# Patient Record
Sex: MALE | Race: WHITE | NOT HISPANIC OR LATINO | Employment: OTHER | URBAN - METROPOLITAN AREA
[De-identification: names, ages, dates, MRNs, and addresses within clinical notes are randomized per-mention and may not be internally consistent; named-entity substitution may affect disease eponyms.]

---

## 2019-11-19 ENCOUNTER — HOSPITAL ENCOUNTER (EMERGENCY)
Facility: HOSPITAL | Age: 44
Discharge: HOME | End: 2019-11-19
Attending: EMERGENCY MEDICINE | Admitting: EMERGENCY MEDICINE

## 2019-11-19 VITALS
OXYGEN SATURATION: 96 % | HEIGHT: 74 IN | TEMPERATURE: 98.8 F | RESPIRATION RATE: 18 BRPM | DIASTOLIC BLOOD PRESSURE: 82 MMHG | HEART RATE: 88 BPM | SYSTOLIC BLOOD PRESSURE: 115 MMHG | BODY MASS INDEX: 22.46 KG/M2 | WEIGHT: 175 LBS

## 2019-11-19 DIAGNOSIS — F10.10 ETOH ABUSE: Primary | ICD-10-CM

## 2019-11-19 DIAGNOSIS — F15.10 METHAMPHETAMINE ABUSE (HCC): ICD-10-CM

## 2019-11-19 LAB
ALBUMIN SERPL-MCNC: 4.6 G/DL (ref 3.5–5.2)
ALBUMIN/GLOB SERPL: 1.4 G/DL
ALP SERPL-CCNC: 59 U/L (ref 39–117)
ALT SERPL W P-5'-P-CCNC: 11 U/L (ref 1–41)
AMPHET+METHAMPHET UR QL: POSITIVE
AMPHETAMINES UR QL: POSITIVE
ANION GAP SERPL CALCULATED.3IONS-SCNC: 19 MMOL/L (ref 5–15)
AST SERPL-CCNC: 18 U/L (ref 1–40)
BACTERIA UR QL AUTO: ABNORMAL /HPF
BARBITURATES UR QL SCN: NEGATIVE
BASOPHILS # BLD AUTO: 0.03 10*3/MM3 (ref 0–0.2)
BASOPHILS NFR BLD AUTO: 0.5 % (ref 0–1.5)
BENZODIAZ UR QL SCN: POSITIVE
BILIRUB SERPL-MCNC: 0.7 MG/DL (ref 0.2–1.2)
BILIRUB UR QL STRIP: ABNORMAL
BUN BLD-MCNC: 21 MG/DL (ref 6–20)
BUN/CREAT SERPL: 19.8 (ref 7–25)
BUPRENORPHINE SERPL-MCNC: NEGATIVE NG/ML
CALCIUM SPEC-SCNC: 9.6 MG/DL (ref 8.6–10.5)
CANNABINOIDS SERPL QL: NEGATIVE
CHLORIDE SERPL-SCNC: 100 MMOL/L (ref 98–107)
CLARITY UR: ABNORMAL
CO2 SERPL-SCNC: 21 MMOL/L (ref 22–29)
COCAINE UR QL: NEGATIVE
COD CRY URNS QL: ABNORMAL /HPF
COLOR UR: ABNORMAL
CREAT BLD-MCNC: 1.06 MG/DL (ref 0.76–1.27)
DEPRECATED RDW RBC AUTO: 39.8 FL (ref 37–54)
EOSINOPHIL # BLD AUTO: 0.05 10*3/MM3 (ref 0–0.4)
EOSINOPHIL NFR BLD AUTO: 0.8 % (ref 0.3–6.2)
ERYTHROCYTE [DISTWIDTH] IN BLOOD BY AUTOMATED COUNT: 12.5 % (ref 12.3–15.4)
ETHANOL BLD-MCNC: <10 MG/DL (ref 0–10)
GFR SERPL CREATININE-BSD FRML MDRD: 77 ML/MIN/1.73
GLOBULIN UR ELPH-MCNC: 3.4 GM/DL
GLUCOSE BLD-MCNC: 131 MG/DL (ref 65–99)
GLUCOSE UR STRIP-MCNC: NEGATIVE MG/DL
HCT VFR BLD AUTO: 46.8 % (ref 37.5–51)
HGB BLD-MCNC: 15.9 G/DL (ref 13–17.7)
HGB UR QL STRIP.AUTO: NEGATIVE
HOLD SPECIMEN: NORMAL
HOLD SPECIMEN: NORMAL
HYALINE CASTS UR QL AUTO: ABNORMAL /LPF
IMM GRANULOCYTES # BLD AUTO: 0.03 10*3/MM3 (ref 0–0.05)
IMM GRANULOCYTES NFR BLD AUTO: 0.5 % (ref 0–0.5)
KETONES UR QL STRIP: ABNORMAL
LEUKOCYTE ESTERASE UR QL STRIP.AUTO: NEGATIVE
LIPASE SERPL-CCNC: 16 U/L (ref 13–60)
LYMPHOCYTES # BLD AUTO: 1.75 10*3/MM3 (ref 0.7–3.1)
LYMPHOCYTES NFR BLD AUTO: 26.4 % (ref 19.6–45.3)
MCH RBC QN AUTO: 29.8 PG (ref 26.6–33)
MCHC RBC AUTO-ENTMCNC: 34 G/DL (ref 31.5–35.7)
MCV RBC AUTO: 87.6 FL (ref 79–97)
METHADONE UR QL SCN: NEGATIVE
MONOCYTES # BLD AUTO: 0.95 10*3/MM3 (ref 0.1–0.9)
MONOCYTES NFR BLD AUTO: 14.3 % (ref 5–12)
MUCOUS THREADS URNS QL MICRO: ABNORMAL /HPF
NEUTROPHILS # BLD AUTO: 3.82 10*3/MM3 (ref 1.7–7)
NEUTROPHILS NFR BLD AUTO: 57.5 % (ref 42.7–76)
NITRITE UR QL STRIP: NEGATIVE
NRBC BLD AUTO-RTO: 0 /100 WBC (ref 0–0.2)
OPIATES UR QL: NEGATIVE
OXYCODONE UR QL SCN: NEGATIVE
PCP UR QL SCN: NEGATIVE
PH UR STRIP.AUTO: <=5 [PH] (ref 5–8)
PLAT MORPH BLD: NORMAL
PLATELET # BLD AUTO: 227 10*3/MM3 (ref 140–450)
PMV BLD AUTO: 9.5 FL (ref 6–12)
POTASSIUM BLD-SCNC: 3.5 MMOL/L (ref 3.5–5.2)
PROPOXYPH UR QL: NEGATIVE
PROT SERPL-MCNC: 8 G/DL (ref 6–8.5)
PROT UR QL STRIP: ABNORMAL
RBC # BLD AUTO: 5.34 10*6/MM3 (ref 4.14–5.8)
RBC # UR: ABNORMAL /HPF
RBC MORPH BLD: NORMAL
REF LAB TEST METHOD: ABNORMAL
SODIUM BLD-SCNC: 140 MMOL/L (ref 136–145)
SP GR UR STRIP: 1.03 (ref 1–1.03)
SQUAMOUS #/AREA URNS HPF: ABNORMAL /HPF
TRICYCLICS UR QL SCN: NEGATIVE
TROPONIN T SERPL-MCNC: <0.01 NG/ML (ref 0–0.03)
UROBILINOGEN UR QL STRIP: ABNORMAL
WBC MORPH BLD: NORMAL
WBC NRBC COR # BLD: 6.63 10*3/MM3 (ref 3.4–10.8)
WBC UR QL AUTO: ABNORMAL /HPF
WHOLE BLOOD HOLD SPECIMEN: NORMAL
WHOLE BLOOD HOLD SPECIMEN: NORMAL

## 2019-11-19 PROCEDURE — 83690 ASSAY OF LIPASE: CPT | Performed by: EMERGENCY MEDICINE

## 2019-11-19 PROCEDURE — 99284 EMERGENCY DEPT VISIT MOD MDM: CPT

## 2019-11-19 PROCEDURE — 25010000002 MAGNESIUM SULFATE PER 500 MG OF MAGNESIUM: Performed by: EMERGENCY MEDICINE

## 2019-11-19 PROCEDURE — 81001 URINALYSIS AUTO W/SCOPE: CPT | Performed by: EMERGENCY MEDICINE

## 2019-11-19 PROCEDURE — 80307 DRUG TEST PRSMV CHEM ANLYZR: CPT

## 2019-11-19 PROCEDURE — 25010000002 LORAZEPAM PER 2 MG: Performed by: EMERGENCY MEDICINE

## 2019-11-19 PROCEDURE — 96365 THER/PROPH/DIAG IV INF INIT: CPT

## 2019-11-19 PROCEDURE — 93005 ELECTROCARDIOGRAM TRACING: CPT | Performed by: EMERGENCY MEDICINE

## 2019-11-19 PROCEDURE — 80053 COMPREHEN METABOLIC PANEL: CPT | Performed by: EMERGENCY MEDICINE

## 2019-11-19 PROCEDURE — 85007 BL SMEAR W/DIFF WBC COUNT: CPT | Performed by: EMERGENCY MEDICINE

## 2019-11-19 PROCEDURE — 25010000002 THIAMINE PER 100 MG: Performed by: EMERGENCY MEDICINE

## 2019-11-19 PROCEDURE — 93005 ELECTROCARDIOGRAM TRACING: CPT

## 2019-11-19 PROCEDURE — 84484 ASSAY OF TROPONIN QUANT: CPT | Performed by: NURSE PRACTITIONER

## 2019-11-19 PROCEDURE — 85025 COMPLETE CBC W/AUTO DIFF WBC: CPT | Performed by: EMERGENCY MEDICINE

## 2019-11-19 PROCEDURE — 96375 TX/PRO/DX INJ NEW DRUG ADDON: CPT

## 2019-11-19 RX ORDER — LORAZEPAM 2 MG/ML
1 INJECTION INTRAMUSCULAR ONCE
Status: COMPLETED | OUTPATIENT
Start: 2019-11-19 | End: 2019-11-19

## 2019-11-19 RX ORDER — DIAZEPAM 5 MG/1
5 TABLET ORAL EVERY 6 HOURS PRN
Qty: 8 TABLET | Refills: 0 | Status: SHIPPED | OUTPATIENT
Start: 2019-11-19 | End: 2021-12-07

## 2019-11-19 RX ADMIN — LORAZEPAM 1 MG: 2 INJECTION INTRAMUSCULAR; INTRAVENOUS at 19:15

## 2019-11-19 RX ADMIN — THIAMINE HYDROCHLORIDE 1000 ML/HR: 100 INJECTION, SOLUTION INTRAMUSCULAR; INTRAVENOUS at 17:58

## 2019-11-19 RX ADMIN — SODIUM CHLORIDE, POTASSIUM CHLORIDE, SODIUM LACTATE AND CALCIUM CHLORIDE 1000 ML: 600; 310; 30; 20 INJECTION, SOLUTION INTRAVENOUS at 17:28

## 2019-11-19 NOTE — PROGRESS NOTES
Continued Stay Note  [facility]     Patient Name: Milton Madrid  MRN: [id number]  Today's Date: 11/19/2019    Admit Date: 11/19/2019    Discharge Plan     Row Name 11/19/19 1812       Plan    Plan  [facility] IOP with sober living option Thursday @ 1000    Plan Comments  Interviewed pt in the ER- he has a long standing AUDX20 years.  He has completed 14 different treatment programs- the last 2 programs were:  [facility] ([facility] term care) he completed that 2 years ago and was a [facility] [place].  He relapsed after that and attended [facility] but did not complete that program.[facility] the , [facility] and the patient is welcome to come back there for treatment.  He states that recently he has been drinking about a case of beer per day, but in the past couple of days his drinking has diminished secondary to Methamphetamine usage.  He used 1 gm of methamphetamine in 24 hours. He does not wish to go into detox, and per his recent use hx, detox may not actually best serve him as her main DOC is meth.  He does not desire inpatient treatment either, he feels that an IOP program will serve him the very best.  He is alert and oriented with rapid speech patterns, but is completely lucid. He is becoming anxious at this time- I spoke with PB Coleman and relayed all of this information.  At this time, patient has an appointment for Agustin this Thursday @1000.  [facility] also [facility] higher [facility], should this patient require that, they will refer him to their other higher level of care facilities.  Thank you very much for involving me in this pleasant gentleman's care.        Discharge Codes    No documentation.             Avis Proctor, RN ,BSN   Addiction Coordinator

## 2019-11-19 NOTE — ED PROVIDER NOTES
Subjective   Milton Madrid is a 44 y.o. male who presents to the ED with c/o an addiction problem. The patient presents for alcohol detox upon his own and his family wishes. He has a history of alcohol abuse, having never gone longer than a year being sober and reports that for the past 20 weeks he has drank 30 beers a day. His last alcoholic beverage was this morning. He states he has not done methamphetamine in approximately 1.5 years but 11 days ago he purchased 1 gram of it and had snorted all of it by the next day. The patient reports having hallucinations while being high on the methamphetamine. He complains of body aches and resolved chest pain but denies suicidal ideation. There are no other acute complaints at this time.        History provided by:  Patient  Addiction Problem   Location:  Addicted to alcohol  Severity:  Moderate  Onset quality:  Gradual  Duration:  20 weeks  Timing:  Constant  Progression:  Worsening  Chronicity:  Chronic  Context:  Patient has a history of alcohol abuse and recently snorted methamphetamine  Relieved by:  None tried  Worsened by:  Nothing  Ineffective treatments:  None tried  Associated symptoms: chest pain (resolved)    Associated symptoms: no loss of consciousness    Risk factors:  Alcohol abuse.      Review of Systems   Cardiovascular: Positive for chest pain (resolved).   Neurological: Negative for loss of consciousness, syncope and weakness.   Psychiatric/Behavioral: Positive for hallucinations. Negative for self-injury and suicidal ideas.   All other systems reviewed and are negative.      Past Medical History:   Diagnosis Date   • Panic attack        No Known Allergies    History reviewed. No pertinent surgical history.    History reviewed. No pertinent family history.    Social History     Socioeconomic History   • Marital status:      Spouse name: Not on file   • Number of children: Not on file   • Years of education: Not on file   • Highest education  level: Not on file         Objective   Physical Exam   Constitutional: He is oriented to person, place, and time. He appears well-developed and well-nourished. No distress.   HENT:   Head: Normocephalic and atraumatic.   Eyes: Conjunctivae are normal. No scleral icterus.   Neck: Normal range of motion. Neck supple.   Cardiovascular: Normal rate, regular rhythm and normal heart sounds.   No murmur heard.  Pulmonary/Chest: Effort normal and breath sounds normal. No respiratory distress.   Abdominal: Soft. There is no tenderness.   Musculoskeletal: Normal range of motion. He exhibits no edema.   Neurological: He is alert and oriented to person, place, and time.   Skin: Skin is warm and dry.   Psychiatric: He has a normal mood and affect. His behavior is normal.   Nursing note and vitals reviewed.      Procedures         ED Course  ED Course as of Nov 19 2020 Tue Nov 19, 2019   1717 Methamphetamine, Ur: (!) Positive [RS]   1717 Amphetamine, Urine Qual: (!) Positive [RS]   1717 Benzodiazepine Screen, Urine: (!) Positive [RS]   1736 [facility] at .  [JM]   5199 RALPH query complete. Treatment plan to include limited course of prescribed  controlled substance. Risks including addiction, benefits, and alternatives presented to patient.   Request number: [id number]  [BS]   185 Lyle AMBRIZ is bedside re-evaluating the patient and updating him on the results of the studies.  [BS]   2017 Plan of care discussed with Dr. Dalton.  Will give a short course of Valium until he can be evaluated by his outpatient detox clinic.  He is going to be going to a place [facility] which is run by somebody named Ag.  He has been there before and has had good results.  And apparently they have the capability of doing inpatient as well.  Patient is aware of these plans.  He feels much better he is wanting around the ER ready to leave.  He has no urinary symptoms.  [JM]      ED Course User Index  [BS] Alvin Vergara  BOBY  [JM] Lyle Wood APRN  [RS] Gerardo Dalton MD       Recent Results (from the past 24 hour(s))   Ethanol    Collection Time: 11/19/19  1:59 PM   Result Value Ref Range    Ethanol <10 0 - 10 mg/dL   Urine Drug Screen - Urine, Clean Catch    Collection Time: 11/19/19  1:59 PM   Result Value Ref Range    THC, Screen, Urine Negative Negative    Phencyclidine (PCP), Urine Negative Negative    Cocaine Screen, Urine Negative Negative    Methamphetamine, Ur Positive (A) Negative    Opiate Screen Negative Negative    Amphetamine Screen, Urine Positive (A) Negative    Benzodiazepine Screen, Urine Positive (A) Negative    Tricyclic Antidepressants Screen Negative Negative    Methadone Screen, Urine Negative Negative    Barbiturates Screen, Urine Negative Negative    Oxycodone Screen, Urine Negative Negative    Propoxyphene Screen Negative Negative    Buprenorphine, Screen, Urine Negative Negative   Light Blue Top    Collection Time: 11/19/19  1:59 PM   Result Value Ref Range    Extra Tube hold for add-on    Green Top (Gel)    Collection Time: 11/19/19  1:59 PM   Result Value Ref Range    Extra Tube Hold for add-ons.    Lavender Top    Collection Time: 11/19/19  1:59 PM   Result Value Ref Range    Extra Tube hold for add-on    Gold Top - SST    Collection Time: 11/19/19  1:59 PM   Result Value Ref Range    Extra Tube Hold for add-ons.    Comprehensive Metabolic Panel    Collection Time: 11/19/19  1:59 PM   Result Value Ref Range    Glucose 131 (H) 65 - 99 mg/dL    BUN 21 (H) 6 - 20 mg/dL    Creatinine 1.06 0.76 - 1.27 mg/dL    Sodium 140 136 - 145 mmol/L    Potassium 3.5 3.5 - 5.2 mmol/L    Chloride 100 98 - 107 mmol/L    CO2 21.0 (L) 22.0 - 29.0 mmol/L    Calcium 9.6 8.6 - 10.5 mg/dL    Total Protein 8.0 6.0 - 8.5 g/dL    Albumin 4.60 3.50 - 5.20 g/dL    ALT (SGPT) 11 1 - 41 U/L    AST (SGOT) 18 1 - 40 U/L    Alkaline Phosphatase 59 39 - 117 U/L    Total Bilirubin 0.7 0.2 - 1.2 mg/dL    eGFR Non African Amer 77 >60  mL/min/1.73    Globulin 3.4 gm/dL    A/G Ratio 1.4 g/dL    BUN/Creatinine Ratio 19.8 7.0 - 25.0    Anion Gap 19.0 (H) 5.0 - 15.0 mmol/L   Urinalysis With Microscopic If Indicated (No Culture) - Urine, Clean Catch    Collection Time: 11/19/19  1:59 PM   Result Value Ref Range    Color, UA Dark Yellow (A) Yellow, Straw    Appearance, UA Turbid (A) Clear    pH, UA <=5.0 5.0 - 8.0    Specific Gravity, UA 1.032 (H) 1.001 - 1.030    Glucose, UA Negative Negative    Ketones, UA 15 mg/dL (1+) (A) Negative    Bilirubin, UA Small (1+) (A) Negative    Blood, UA Negative Negative    Protein, UA Trace (A) Negative    Leuk Esterase, UA Negative Negative    Nitrite, UA Negative Negative    Urobilinogen, UA 1.0 E.U./dL 0.2 - 1.0 E.U./dL   Lipase    Collection Time: 11/19/19  1:59 PM   Result Value Ref Range    Lipase 16 13 - 60 U/L   CBC Auto Differential    Collection Time: 11/19/19  1:59 PM   Result Value Ref Range    WBC 6.63 3.40 - 10.80 10*3/mm3    RBC 5.34 4.14 - 5.80 10*6/mm3    Hemoglobin 15.9 13.0 - 17.7 g/dL    Hematocrit 46.8 37.5 - 51.0 %    MCV 87.6 79.0 - 97.0 fL    MCH 29.8 26.6 - 33.0 pg    MCHC 34.0 31.5 - 35.7 g/dL    RDW 12.5 12.3 - 15.4 %    RDW-SD 39.8 37.0 - 54.0 fl    MPV 9.5 6.0 - 12.0 fL    Platelets 227 140 - 450 10*3/mm3    Neutrophil % 57.5 42.7 - 76.0 %    Lymphocyte % 26.4 19.6 - 45.3 %    Monocyte % 14.3 (H) 5.0 - 12.0 %    Eosinophil % 0.8 0.3 - 6.2 %    Basophil % 0.5 0.0 - 1.5 %    Immature Grans % 0.5 0.0 - 0.5 %    Neutrophils, Absolute 3.82 1.70 - 7.00 10*3/mm3    Lymphocytes, Absolute 1.75 0.70 - 3.10 10*3/mm3    Monocytes, Absolute 0.95 (H) 0.10 - 0.90 10*3/mm3    Eosinophils, Absolute 0.05 0.00 - 0.40 10*3/mm3    Basophils, Absolute 0.03 0.00 - 0.20 10*3/mm3    Immature Grans, Absolute 0.03 0.00 - 0.05 10*3/mm3    nRBC 0.0 0.0 - 0.2 /100 WBC   Troponin    Collection Time: 11/19/19  1:59 PM   Result Value Ref Range    Troponin T <0.010 0.000 - 0.030 ng/mL   Urinalysis, Microscopic Only -  Urine, Clean Catch    Collection Time: 11/19/19  1:59 PM   Result Value Ref Range    RBC, UA 3-6 (A) None Seen, 0-2 /HPF    WBC, UA 3-5 (A) None Seen, 0-2 /HPF    Bacteria, UA 3+ (A) None Seen, Trace /HPF    Squamous Epithelial Cells, UA 0-2 None Seen, 0-2 /HPF    Hyaline Casts, UA 13-20 0 - 6 /LPF    Calcium Oxalate Crystals, UA Large/3+ None Seen /HPF    Mucus, UA Large/3+ (A) None Seen, Trace /HPF    Methodology Manual Light Microscopy    Scan Slide    Collection Time: 11/19/19  1:59 PM   Result Value Ref Range    RBC Morphology Normal Normal    WBC Morphology Normal Normal    Platelet Morphology Normal Normal     Note: In addition to lab results from this visit, the labs listed above may include labs taken at another facility or during a different encounter within the last 24 hours. Please correlate lab times with ED admission and discharge times for further clarification of the services performed during this visit.    No orders to display     Vitals:    11/19/19 1800 11/19/19 1815 11/19/19 1930 11/19/19 1945   BP: 142/94 136/97 123/91 115/82   BP Location:       Patient Position:       Pulse: 93 98 84 88   Resp:       Temp:       TempSrc:       SpO2: 98% 97% 95% 96%   Weight:       Height:         Medications   multiple vitamin (M.V.I. Adult) 10 mL, thiamine (B-1) 100 mg, folic acid 1 mg, magnesium sulfate 2 g in sodium chloride 0.9 % 1,000 mL infusion (0 mL/hr Intravenous Stopped 11/19/19 1914)   lactated ringers bolus 1,000 mL (0 mL Intravenous Stopped 11/19/19 1759)   LORazepam (ATIVAN) injection 1 mg (1 mg Intravenous Given 11/19/19 1915)     ECG/EMG Results (last 24 hours)     Procedure Component Value Units Date/Time    ECG 12 Lead [[id number]] Collected:  11/19/19 1420     Updated:  11/19/19 1722        ECG 12 Lead                           [facility]    Final diagnoses:   ETOH abuse   Methamphetamine abuse (CMS/[facility])       Documentation assistance provided by perri LANDIS [facility].  Information recorded by the  scribe was done at my direction and has been verified and validated by me.     Alvin Vergara  11/19/19 1750       Lyle Wood APRN  11/19/19 0799

## 2020-03-28 ENCOUNTER — HOSPITAL ENCOUNTER (EMERGENCY)
Facility: HOSPITAL | Age: 45
Discharge: LEFT BEFORE DISCHARGE | End: 2020-03-28

## 2020-03-28 VITALS
WEIGHT: 180 LBS | HEIGHT: 74 IN | BODY MASS INDEX: 23.1 KG/M2 | TEMPERATURE: 99 F | OXYGEN SATURATION: 95 % | SYSTOLIC BLOOD PRESSURE: 119 MMHG | DIASTOLIC BLOOD PRESSURE: 80 MMHG | HEART RATE: 100 BPM | RESPIRATION RATE: 20 BRPM

## 2020-03-28 PROCEDURE — 99211 OFF/OP EST MAY X REQ PHY/QHP: CPT

## 2021-04-09 ENCOUNTER — HOSPITAL ENCOUNTER (OUTPATIENT)
Dept: VACCINE CLINIC | Facility: HOSPITAL | Age: 46
Discharge: HOME | End: 2021-04-09
Attending: INTERNAL MEDICINE

## 2021-12-07 ENCOUNTER — LAB (OUTPATIENT)
Dept: LAB | Facility: HOSPITAL | Age: 46
End: 2021-12-07

## 2021-12-07 ENCOUNTER — OFFICE VISIT (OUTPATIENT)
Dept: INTERNAL MEDICINE | Facility: CLINIC | Age: 46
End: 2021-12-07

## 2021-12-07 ENCOUNTER — TELEPHONE (OUTPATIENT)
Dept: INTERNAL MEDICINE | Facility: CLINIC | Age: 46
End: 2021-12-07

## 2021-12-07 VITALS
DIASTOLIC BLOOD PRESSURE: 93 MMHG | WEIGHT: 194 LBS | HEIGHT: 74 IN | TEMPERATURE: 98 F | BODY MASS INDEX: 24.9 KG/M2 | HEART RATE: 71 BPM | SYSTOLIC BLOOD PRESSURE: 130 MMHG

## 2021-12-07 DIAGNOSIS — M54.31 SCIATICA OF RIGHT SIDE: ICD-10-CM

## 2021-12-07 DIAGNOSIS — Z51.81 ENCOUNTER FOR MONITORING SUBOXONE MAINTENANCE THERAPY: ICD-10-CM

## 2021-12-07 DIAGNOSIS — Z83.3 FAMILY HISTORY OF DIABETES MELLITUS: ICD-10-CM

## 2021-12-07 DIAGNOSIS — Z13.21 ENCOUNTER FOR VITAMIN DEFICIENCY SCREENING: Primary | ICD-10-CM

## 2021-12-07 DIAGNOSIS — F41.9 ANXIETY AND DEPRESSION: ICD-10-CM

## 2021-12-07 DIAGNOSIS — Z13.220 LIPID SCREENING: ICD-10-CM

## 2021-12-07 DIAGNOSIS — Z79.899 ENCOUNTER FOR MONITORING SUBOXONE MAINTENANCE THERAPY: ICD-10-CM

## 2021-12-07 DIAGNOSIS — Z13.21 ENCOUNTER FOR VITAMIN DEFICIENCY SCREENING: ICD-10-CM

## 2021-12-07 DIAGNOSIS — F51.01 PRIMARY INSOMNIA: ICD-10-CM

## 2021-12-07 DIAGNOSIS — F32.A ANXIETY AND DEPRESSION: ICD-10-CM

## 2021-12-07 LAB
25(OH)D3 SERPL-MCNC: 39 NG/ML (ref 30–100)
ALBUMIN SERPL-MCNC: 4.7 G/DL (ref 3.5–5.2)
ALBUMIN/GLOB SERPL: 2 G/DL
ALP SERPL-CCNC: 61 U/L (ref 39–117)
ALT SERPL W P-5'-P-CCNC: 6 U/L (ref 1–41)
AMPHET+METHAMPHET UR QL: NEGATIVE
AMPHETAMINES UR QL: NEGATIVE
ANION GAP SERPL CALCULATED.3IONS-SCNC: 5.9 MMOL/L (ref 5–15)
AST SERPL-CCNC: 14 U/L (ref 1–40)
BARBITURATES UR QL SCN: NEGATIVE
BASOPHILS # BLD AUTO: 0.04 10*3/MM3 (ref 0–0.2)
BASOPHILS NFR BLD AUTO: 0.5 % (ref 0–1.5)
BENZODIAZ UR QL SCN: NEGATIVE
BILIRUB SERPL-MCNC: 0.3 MG/DL (ref 0–1.2)
BUN SERPL-MCNC: 12 MG/DL (ref 6–20)
BUN/CREAT SERPL: 13.3 (ref 7–25)
BUPRENORPHINE SERPL-MCNC: POSITIVE NG/ML
CALCIUM SPEC-SCNC: 9.7 MG/DL (ref 8.6–10.5)
CANNABINOIDS SERPL QL: NEGATIVE
CHLORIDE SERPL-SCNC: 99 MMOL/L (ref 98–107)
CO2 SERPL-SCNC: 33.1 MMOL/L (ref 22–29)
COCAINE UR QL: NEGATIVE
CREAT SERPL-MCNC: 0.9 MG/DL (ref 0.76–1.27)
DEPRECATED RDW RBC AUTO: 37.4 FL (ref 37–54)
EOSINOPHIL # BLD AUTO: 0.09 10*3/MM3 (ref 0–0.4)
EOSINOPHIL NFR BLD AUTO: 1.1 % (ref 0.3–6.2)
ERYTHROCYTE [DISTWIDTH] IN BLOOD BY AUTOMATED COUNT: 13.1 % (ref 12.3–15.4)
GFR SERPL CREATININE-BSD FRML MDRD: 92 ML/MIN/1.73
GLOBULIN UR ELPH-MCNC: 2.4 GM/DL
GLUCOSE SERPL-MCNC: 77 MG/DL (ref 65–99)
HCT VFR BLD AUTO: 42.6 % (ref 37.5–51)
HGB BLD-MCNC: 14.9 G/DL (ref 13–17.7)
IMM GRANULOCYTES # BLD AUTO: 0.02 10*3/MM3 (ref 0–0.05)
IMM GRANULOCYTES NFR BLD AUTO: 0.3 % (ref 0–0.5)
LYMPHOCYTES # BLD AUTO: 1.99 10*3/MM3 (ref 0.7–3.1)
LYMPHOCYTES NFR BLD AUTO: 25.3 % (ref 19.6–45.3)
MCH RBC QN AUTO: 28.2 PG (ref 26.6–33)
MCHC RBC AUTO-ENTMCNC: 35 G/DL (ref 31.5–35.7)
MCV RBC AUTO: 80.5 FL (ref 79–97)
METHADONE UR QL SCN: NEGATIVE
MONOCYTES # BLD AUTO: 0.55 10*3/MM3 (ref 0.1–0.9)
MONOCYTES NFR BLD AUTO: 7 % (ref 5–12)
NEUTROPHILS NFR BLD AUTO: 5.18 10*3/MM3 (ref 1.7–7)
NEUTROPHILS NFR BLD AUTO: 65.8 % (ref 42.7–76)
NRBC BLD AUTO-RTO: 0 /100 WBC (ref 0–0.2)
OPIATES UR QL: NEGATIVE
OXYCODONE UR QL SCN: NEGATIVE
PCP UR QL SCN: NEGATIVE
PLATELET # BLD AUTO: 208 10*3/MM3 (ref 140–450)
PMV BLD AUTO: 10.2 FL (ref 6–12)
POTASSIUM SERPL-SCNC: 4.9 MMOL/L (ref 3.5–5.2)
PROPOXYPH UR QL: NEGATIVE
PROT SERPL-MCNC: 7.1 G/DL (ref 6–8.5)
RBC # BLD AUTO: 5.29 10*6/MM3 (ref 4.14–5.8)
SODIUM SERPL-SCNC: 138 MMOL/L (ref 136–145)
TRICYCLICS UR QL SCN: NEGATIVE
VIT B12 BLD-MCNC: 341 PG/ML (ref 211–946)
WBC NRBC COR # BLD: 7.87 10*3/MM3 (ref 3.4–10.8)

## 2021-12-07 PROCEDURE — 80053 COMPREHEN METABOLIC PANEL: CPT

## 2021-12-07 PROCEDURE — 99204 OFFICE O/P NEW MOD 45 MIN: CPT | Performed by: PHYSICIAN ASSISTANT

## 2021-12-07 PROCEDURE — 85025 COMPLETE CBC W/AUTO DIFF WBC: CPT

## 2021-12-07 PROCEDURE — 82306 VITAMIN D 25 HYDROXY: CPT

## 2021-12-07 PROCEDURE — 82607 VITAMIN B-12: CPT

## 2021-12-07 PROCEDURE — 84439 ASSAY OF FREE THYROXINE: CPT

## 2021-12-07 PROCEDURE — 80306 DRUG TEST PRSMV INSTRMNT: CPT

## 2021-12-07 PROCEDURE — 84443 ASSAY THYROID STIM HORMONE: CPT

## 2021-12-07 RX ORDER — CHOLECALCIFEROL (VITAMIN D3) 125 MCG
CAPSULE ORAL
COMMUNITY
Start: 2021-11-02 | End: 2021-12-07

## 2021-12-07 RX ORDER — DULOXETIN HYDROCHLORIDE 60 MG/1
60 CAPSULE, DELAYED RELEASE ORAL DAILY
Qty: 30 CAPSULE | Refills: 3 | Status: SHIPPED | OUTPATIENT
Start: 2021-12-07

## 2021-12-07 RX ORDER — HYDROXYZINE PAMOATE 25 MG/1
25 CAPSULE ORAL 2 TIMES DAILY PRN
Qty: 60 CAPSULE | Refills: 2 | Status: SHIPPED | OUTPATIENT
Start: 2021-12-07

## 2021-12-07 RX ORDER — HYDROXYZINE PAMOATE 25 MG/1
CAPSULE ORAL
COMMUNITY
Start: 2021-11-02 | End: 2021-12-07 | Stop reason: SDUPTHER

## 2021-12-07 RX ORDER — DULOXETINE 40 MG/1
CAPSULE, DELAYED RELEASE ORAL
COMMUNITY
Start: 2021-11-23 | End: 2021-12-07

## 2021-12-07 RX ORDER — CYCLOBENZAPRINE HCL 10 MG
TABLET ORAL
COMMUNITY
Start: 2021-11-02

## 2021-12-07 RX ORDER — BUPRENORPHINE HYDROCHLORIDE AND NALOXONE HYDROCHLORIDE DIHYDRATE 8; 2 MG/1; MG/1
TABLET SUBLINGUAL
COMMUNITY
Start: 2021-12-03

## 2021-12-07 NOTE — TELEPHONE ENCOUNTER
Caller: Milton Madrid    Relationship: Self    Best call back number: [phone]    What is the best time to reach you: ANYTIME    Who are you requesting to speak with (clinical staff, provider,  specific staff member): CLINICAL STAFF    Do you know the name of the person who called:     What was the call regarding: [facility] TO  [place] [address] [address] AT [facility] - [phone] [facility] [phone] FX

## 2021-12-07 NOTE — PROGRESS NOTES
Patient Care Team:  Elyssa Gamble PA-C as PCP - General (Physician Assistant)    Chief Complaint::   Chief Complaint   Patient presents with   • referral for sciatica   • Med Refill        Subjective     HPI  Milton is a 46 year old male who presents to [facility].  Originally from [facility] to [place].  He has lived here for the past 10 years to be closer to daughter who lives in [place].  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  He reports father  of MI.  History of hypertension diabetes.  Mother living no known medical conditions.  His past medical history significant for substance abuse.  He has been sober since 2021. Currently receives treatment [facility].  Maintenance dose of 8 mg twice daily.  He has been on this dose on and off over 5 years.  He does submit to drug testing weekly.  [facility] clinic offers psychotherapist every week for 45 minutes.  He does not receive any counseling outside of this.  He has history of anxiety and depression.  He has been on duloxetine for over a year, at 40 mg dose.  Interested in increasing this.  Has occasional difficulty falling asleep.  Ongoing sciatica on the right side.  Has used cyclobenzaprine 10 mg in the past.  He tries to stretch daily.  Currently smokes 1 pack cigarettes per week.  He has had both COVID vaccinations.  He denies chest pain, shortness of breath, palpitations, or headaches.          The following portions of the patient's history were reviewed and updated as appropriate: active problem list, medication list, allergies, family history, social history    Review of Systems:   Review of Systems   Constitutional: Negative for activity change, appetite change, diaphoresis, fatigue, unexpected weight gain and unexpected weight loss.   HENT: Negative for congestion, hearing loss, rhinorrhea, sinus pressure and sneezing.    Eyes: Negative for visual disturbance.   Respiratory:  "Negative for chest tightness and shortness of breath.    Cardiovascular: Negative for chest pain, palpitations and leg swelling.   Gastrointestinal: Negative for abdominal pain, blood in stool, GERD and indigestion.   Endocrine: Negative for cold intolerance and heat intolerance.   Genitourinary: Negative for dysuria and hematuria.   Musculoskeletal: Negative for arthralgias and myalgias.   Skin: Negative for skin lesions.   Neurological: Negative for tremors, seizures, syncope, speech difficulty, weakness, headache, memory problem and confusion.   Hematological: Does not bruise/bleed easily.   Psychiatric/Behavioral: Negative for sleep disturbance, depressed mood and stress. The patient is nervous/anxious.        Vital Signs  Vitals:    12/07/21 1306   BP: 130/93   BP Location: Left arm   Patient Position: Sitting   Cuff Size: Adult   Pulse: 71   Temp: 98 °F (36.7 °C)   TempSrc: Temporal   Weight: 88 kg (194 lb)   Height: 188 cm (74.02\")   PainSc:   3     Body mass index is 24.9 kg/m².    Labs  Lab on 12/07/2021   Component Date Value Ref Range Status   • Glucose 12/07/2021 77  65 - 99 mg/dL Final   • BUN 12/07/2021 12  6 - 20 mg/dL Final   • Creatinine 12/07/2021 0.90  0.76 - 1.27 mg/dL Final   • Sodium 12/07/2021 138  136 - 145 mmol/L Final   • Potassium 12/07/2021 4.9  3.5 - 5.2 mmol/L Final   • Chloride 12/07/2021 99  98 - 107 mmol/L Final   • CO2 12/07/2021 33.1* 22.0 - 29.0 mmol/L Final   • Calcium 12/07/2021 9.7  8.6 - 10.5 mg/dL Final   • Total Protein 12/07/2021 7.1  6.0 - 8.5 g/dL Final   • Albumin 12/07/2021 4.70  3.50 - 5.20 g/dL Final   • ALT (SGPT) 12/07/2021 6  1 - 41 U/L Final   • AST (SGOT) 12/07/2021 14  1 - 40 U/L Final   • Alkaline Phosphatase 12/07/2021 61  39 - 117 U/L Final   • Total Bilirubin 12/07/2021 0.3  0.0 - 1.2 mg/dL Final   • eGFR Non  Amer 12/07/2021 92  >60 mL/min/1.73 Final   • Globulin 12/07/2021 2.4  gm/dL Final   • A/G Ratio 12/07/2021 2.0  g/dL Final   • BUN/Creatinine " Ratio 12/07/2021 13.3  7.0 - 25.0 Final   • Anion Gap 12/07/2021 5.9  5.0 - 15.0 mmol/L Final   • TSH 12/07/2021 0.926  0.270 - 4.200 uIU/mL Final   • Free T4 12/07/2021 1.21  0.93 - 1.70 ng/dL Final   • Vitamin B-12 12/07/2021 341  211 - 946 pg/mL Final   • 25 Hydroxy, Vitamin D 12/07/2021 39.0  30.0 - 100.0 ng/ml Final   • THC, Screen, Urine 12/07/2021 Negative  Negative Final   • Phencyclidine (PCP), Urine 12/07/2021 Negative  Negative Final   • Cocaine Screen, Urine 12/07/2021 Negative  Negative Final   • Methamphetamine, Ur 12/07/2021 Negative  Negative Final   • Opiate Screen 12/07/2021 Negative  Negative Final   • Amphetamine Screen, Urine 12/07/2021 Negative  Negative Final   • Benzodiazepine Screen, Urine 12/07/2021 Negative  Negative Final   • Tricyclic Antidepressants Screen 12/07/2021 Negative  Negative Final   • Methadone Screen, Urine 12/07/2021 Negative  Negative Final   • Barbiturates Screen, Urine 12/07/2021 Negative  Negative Final   • Oxycodone Screen, Urine 12/07/2021 Negative  Negative Final   • Propoxyphene Screen 12/07/2021 Negative  Negative Final   • Buprenorphine, Screen, Urine 12/07/2021 Positive* Negative Final   • WBC 12/07/2021 7.87  3.40 - 10.80 10*3/mm3 Final   • RBC 12/07/2021 5.29  4.14 - 5.80 10*6/mm3 Final   • Hemoglobin 12/07/2021 14.9  13.0 - 17.7 g/dL Final   • Hematocrit 12/07/2021 42.6  37.5 - 51.0 % Final   • MCV 12/07/2021 80.5  79.0 - 97.0 fL Final   • MCH 12/07/2021 28.2  26.6 - 33.0 pg Final   • MCHC 12/07/2021 35.0  31.5 - 35.7 g/dL Final   • RDW 12/07/2021 13.1  12.3 - 15.4 % Final   • RDW-SD 12/07/2021 37.4  37.0 - 54.0 fl Final   • MPV 12/07/2021 10.2  6.0 - 12.0 fL Final   • Platelets 12/07/2021 208  140 - 450 10*3/mm3 Final   • Neutrophil % 12/07/2021 65.8  42.7 - 76.0 % Final   • Lymphocyte % 12/07/2021 25.3  19.6 - 45.3 % Final   • Monocyte % 12/07/2021 7.0  5.0 - 12.0 % Final   • Eosinophil % 12/07/2021 1.1  0.3 - 6.2 % Final   • Basophil % 12/07/2021 0.5  0.0 -  1.5 % Final   • Immature Grans % 12/07/2021 0.3  0.0 - 0.5 % Final   • Neutrophils, Absolute 12/07/2021 5.18  1.70 - 7.00 10*3/mm3 Final   • Lymphocytes, Absolute 12/07/2021 1.99  0.70 - 3.10 10*3/mm3 Final   • Monocytes, Absolute 12/07/2021 0.55  0.10 - 0.90 10*3/mm3 Final   • Eosinophils, Absolute 12/07/2021 0.09  0.00 - 0.40 10*3/mm3 Final   • Basophils, Absolute 12/07/2021 0.04  0.00 - 0.20 10*3/mm3 Final   • Immature Grans, Absolute 12/07/2021 0.02  0.00 - 0.05 10*3/mm3 Final   • nRBC 12/07/2021 0.0  0.0 - 0.2 /100 WBC Final       Imaging  No radiology results for the last 30 days.      Current Outpatient Medications:   •  buprenorphine-naloxone (SUBOXONE) 8-2 MG per SL tablet, , Disp: , Rfl:   •  cyclobenzaprine (FLEXERIL) 10 MG tablet, , Disp: , Rfl:   •  hydrOXYzine pamoate (VISTARIL) 25 MG capsule, Take 1 capsule by mouth 2 (Two) Times a Day As Needed for Itching., Disp: 60 capsule, Rfl: 2  •  DULoxetine (CYMBALTA) 60 MG capsule, Take 1 capsule by mouth Daily., Disp: 30 capsule, Rfl: 3    Physical Exam:    Physical Exam  Vitals reviewed.   Constitutional:       Appearance: Normal appearance. He is well-developed.   HENT:      Head: Normocephalic and atraumatic.      Right Ear: Hearing, tympanic membrane, ear canal and external ear normal.      Left Ear: Hearing, tympanic membrane, ear canal and external ear normal.      Nose: Nose normal.      Mouth/Throat:      Mouth: Mucous membranes are moist.      Pharynx: Oropharynx is clear. Uvula midline.   Eyes:      General: Lids are normal.      Conjunctiva/sclera: Conjunctivae normal.      Pupils: Pupils are equal, round, and reactive to light.   Cardiovascular:      Rate and Rhythm: Normal rate and regular rhythm.      Heart sounds: Normal heart sounds.   Pulmonary:      Effort: Pulmonary effort is normal.      Breath sounds: Normal breath sounds.   Abdominal:      General: Bowel sounds are normal.      Palpations: Abdomen is soft.   Musculoskeletal:          General: Normal range of motion.      Cervical back: Full passive range of motion without pain, normal range of motion and neck supple.   Skin:     General: Skin is warm and dry.   Neurological:      General: No focal deficit present.      Mental Status: He is alert and oriented to person, place, and time. Mental status is at baseline.      Deep Tendon Reflexes: Reflexes are normal and symmetric.   Psychiatric:         Speech: Speech normal.         Behavior: Behavior normal.         Thought Content: Thought content normal.         Judgment: Judgment normal.         Procedures        Assessment/Plan   Problem List Items Addressed This Visit        Cardiac and Vasculature    Lipid screening    Overview     Will check cholesterol today.            Endocrine and Metabolic    Encounter for vitamin deficiency screening - Primary    Relevant Orders    Vitamin B12 (Completed)    Vitamin D 25 Hydroxy (Completed)       Family History    Family history of diabetes mellitus    Overview     Cut back on sugars in the diet.  Try and cut back on processed foods.         Relevant Orders    Comprehensive Metabolic Panel       Health Encounters    Encounter for monitoring Suboxone maintenance therapy    Overview     Urine drug screen today.  He reports compliance with his medications.  Follows weekly.         Relevant Orders    Urine Drug Screen - Urine, Clean Catch (Completed)       Mental Health    Anxiety and depression    Overview     Increase duloxetine to 60 mg daily.  Prescription of hydroxyzine 25 mg capsules up to twice daily.         Relevant Medications    DULoxetine (CYMBALTA) 60 MG capsule    hydrOXYzine pamoate (VISTARIL) 25 MG capsule       Musculoskeletal and Injuries    Sciatica of right side    Relevant Orders    Vitamin B12 (Completed)       Sleep    Primary insomnia    Overview     Trial of hydroxyzine 25 mg capsules.  May take up to twice daily if needed.         Relevant Orders    CBC & Differential (Completed)     Comprehensive Metabolic Panel (Completed)    TSH (Completed)    T4, Free (Completed)          Return in about 3 months (around 3/7/2022) for RECHECK 15MIN.    Plan of care reviewed with patient at the conclusion of today's visit. Education was provided regarding diagnosis, management, and any prescribed or recommended OTC medications.Patient verbalizes understanding of and agreement with management plan.     I spent 30 minutes caring for Milton on this date of service. This time includes time spent by me in the following activities:preparing for the visit, reviewing tests, obtaining and/or reviewing a separately obtained history, performing a medically appropriate examination and/or evaluation , counseling and educating the patient/family/caregiver, ordering medications, tests, or procedures, referring and communicating with other health care professionals  and documenting information in the medical record    Elyssa Gamble PA-C    Please note that portions of this note were completed with a voice recognition program.

## 2021-12-08 LAB
T4 FREE SERPL-MCNC: 1.21 NG/DL (ref 0.93–1.7)
TSH SERPL DL<=0.05 MIU/L-ACNC: 0.93 UIU/ML (ref 0.27–4.2)

## 2021-12-11 PROBLEM — Z51.81 ENCOUNTER FOR MONITORING SUBOXONE MAINTENANCE THERAPY: Status: ACTIVE | Noted: 2021-12-11

## 2021-12-11 PROBLEM — Z83.3 FAMILY HISTORY OF DIABETES MELLITUS: Status: ACTIVE | Noted: 2021-12-11

## 2021-12-11 PROBLEM — Z13.21 ENCOUNTER FOR VITAMIN DEFICIENCY SCREENING: Status: ACTIVE | Noted: 2021-12-11

## 2021-12-11 PROBLEM — F41.9 ANXIETY AND DEPRESSION: Status: ACTIVE | Noted: 2021-12-11

## 2021-12-11 PROBLEM — F51.01 PRIMARY INSOMNIA: Status: ACTIVE | Noted: 2021-12-11

## 2021-12-11 PROBLEM — F32.A ANXIETY AND DEPRESSION: Status: ACTIVE | Noted: 2021-12-11

## 2021-12-11 PROBLEM — M54.31 SCIATICA OF RIGHT SIDE: Status: ACTIVE | Noted: 2021-12-11

## 2021-12-11 PROBLEM — Z79.899 ENCOUNTER FOR MONITORING SUBOXONE MAINTENANCE THERAPY: Status: ACTIVE | Noted: 2021-12-11

## 2021-12-11 PROBLEM — Z13.220 LIPID SCREENING: Status: ACTIVE | Noted: 2021-12-11

## 2024-02-05 ENCOUNTER — OFFICE VISIT (OUTPATIENT)
Dept: NEUROSURGERY | Facility: CLINIC | Age: 49
End: 2024-02-05
Payer: COMMERCIAL

## 2024-02-05 VITALS — BODY MASS INDEX: 32.55 KG/M2 | HEIGHT: 74 IN | WEIGHT: 253.6 LBS

## 2024-02-05 DIAGNOSIS — G89.29 CHRONIC BILATERAL LOW BACK PAIN WITHOUT SCIATICA: Primary | ICD-10-CM

## 2024-02-05 DIAGNOSIS — M54.50 CHRONIC BILATERAL LOW BACK PAIN WITHOUT SCIATICA: Primary | ICD-10-CM

## 2024-02-05 PROBLEM — F17.209 NICOTINE DEPENDENCE WITH NICOTINE-INDUCED DISORDER: Status: ACTIVE | Noted: 2024-02-05

## 2024-02-05 PROBLEM — F17.209 NICOTINE DEPENDENCE WITH NICOTINE-INDUCED DISORDER: Status: RESOLVED | Noted: 2024-02-05 | Resolved: 2024-02-05

## 2024-02-05 PROCEDURE — 99204 OFFICE O/P NEW MOD 45 MIN: CPT | Performed by: NEUROLOGICAL SURGERY

## 2024-02-05 RX ORDER — ASPIRIN 81 MG/1
81 TABLET, COATED ORAL DAILY
COMMUNITY

## 2024-02-05 NOTE — PROGRESS NOTES
"Subjective     Chief Complaint: Back pain    Patient ID: Milton Madrid is a 49 y.o. male seen for consultation today at the request of  Geeta Hunt*    History of Present Illness    This is a 49-year-old man who presents to my office with chief complaints of a longstanding, multiyear history of low back pain.  He tried physical therapy most recently in about .  His symptoms have gotten progressively worse.  He has not tried any pain management or physical therapy recently.    He is a  and has a desk job.  He would estimate that he is seated for about 8 hours a day although he does intermittently get up and walk the floor.    He is a recovering drug user and states that he has been sober for about 9 months now.  He is trying to wean himself off of nicotine and recently switched to a patch.        The following portions of the patient's history were reviewed and updated as appropriate: allergies, current medications, past family history, past medical history, past social history, past surgical history and problem list.    Family history:   Family History   Problem Relation Age of Onset    Hyperlipidemia Mother     Diabetes Father     Alcohol abuse Maternal Uncle        Social history:   Social History     Socioeconomic History    Marital status:    Tobacco Use    Smoking status: Former     Packs/day: 1.50     Years: 15.00     Additional pack years: 0.00     Total pack years: 22.50     Types: Cigarettes     Start date: 1991     Quit date: 2022     Years since quittin.0    Smokeless tobacco: Never   Substance and Sexual Activity    Alcohol use: Not Currently    Drug use: Not Currently     Types: \"Crack\" cocaine, Cocaine(coke), Heroin, Hydrocodone, Marijuana, Methamphetamines, Opium, Oxycodone     Comment: Is sober and in recovery.    Sexual activity: Yes     Partners: Female       Review of Systems    Objective   Height 188 cm (74\"), weight 115 kg (253 lb 9.6 oz).  Body " mass index is 32.56 kg/m².    Physical Exam  Vitals reviewed.   Constitutional:       General: He is not in acute distress.     Appearance: He is well-developed. He is not diaphoretic.   HENT:      Head: Normocephalic and atraumatic.   Pulmonary:      Effort: Pulmonary effort is normal.   Musculoskeletal:      Comments: Poor range of motion of the hips particularly with external rotation bilaterally, right more so than left.  Pain with external rotation of the right hip, and tender over the trochanteric bursa.  Slump test is negative   Skin:     General: Skin is warm and dry.   Neurological:      Mental Status: He is alert and oriented to person, place, and time.      Deep Tendon Reflexes:      Reflex Scores:       Patellar reflexes are 2+ on the right side and 2+ on the left side.       Achilles reflexes are 2+ on the right side and 2+ on the left side.     Comments: Muscle stretch reflexes are symmetric and within normal limits.  Casual, toe raised, heel raised, and tandem gait testing are performed unremarkably.  Station is intact.   Psychiatric:         Behavior: Behavior normal.         Assessment & Plan     Independent Review of Radiographic Studies:      Available for my review is a MRI of the lumbar spine which was performed on January 3, 2024.  There is diffuse degenerative disc disease from L2 all the way down to the sacrum.  Lumbar lordosis has been eliminated.  There is moderate fatty replacement of the paraspinous musculature.  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  There is diffuse facet arthropathy which in conjunction with the congenitally narrowed spinal canal results in multilevel lateral recess stenosis which for the most part is moderate, potentially bordering on severe at L2-3, L3-4, and L4-5.  There is diffuse intraforaminal stenosis which is moderate for the most part.  I do not appreciate any areas of spondylolisthesis.  There are no imaging characteristics concerning for  discitis or vertebral osteomyelitis.  I do not appreciate any pars defects.    Medical Decision Making:      This is a 49-year-old man with predominantly axial low back pain and some right hip pain.  There certainly could be a component of lumbosacral radiculopathy contributing to his right hip pain, however at the present time given that he has not tried any real conservative treatment recently, my recommendation is for pain management and physical therapy.  I did review the signs and symptoms of lumbosacral radiculopathy and cauda equina with him.  I directed him to contact my office with new or worsening symptoms, however I think he is an excellent candidate for conservative treatment.  I would be happy to follow-up with him on an as-needed basis for new or worsening symptoms or if he fails to experience the expected benefit from the aforementioned treatment modalities.    Diagnoses and all orders for this visit:    1. Chronic bilateral low back pain without sciatica (Primary)  -     Ambulatory Referral to Physical Therapy Evaluate and treat; Soft Tissue Mobilizaton; Stretching, ROM, Strengthening  -     Ambulatory Referral to Pain Management        No follow-ups on file.           This document signed by CASSANDRA Adler MD February 5, 2024 09:53 EST

## 2024-02-05 NOTE — PROGRESS NOTES
"Subjective     Chief Complaint: ***    Patient ID: Milton Madrid is a 49 y.o. male {Specialty - why patient here?:12183}    Back Pain  Associated symptoms include headaches and numbness.     ***    The following portions of the patient's history were reviewed and updated as appropriate: allergies, current medications, past family history, past medical history, past social history, past surgical history and problem list.    Family history:   Family History   Problem Relation Age of Onset   • Hyperlipidemia Mother    • Diabetes Father    • Alcohol abuse Maternal Uncle        Social history:   Social History     Socioeconomic History   • Marital status:    Tobacco Use   • Smoking status: Former     Packs/day: 1.50     Years: 15.00     Additional pack years: 0.00     Total pack years: 22.50     Types: Cigarettes     Start date: 1991     Quit date: 2022     Years since quittin.0   • Smokeless tobacco: Never   Substance and Sexual Activity   • Alcohol use: Not Currently   • Drug use: Not Currently     Types: \"Crack\" cocaine, Cocaine(coke), Heroin, Hydrocodone, Marijuana, Methamphetamines, Opium, Oxycodone     Comment: Is sober and in recovery.   • Sexual activity: Yes     Partners: Female       Review of Systems   Constitutional:  Positive for activity change.   Cardiovascular:  Positive for palpitations and leg swelling.   Genitourinary:  Positive for flank pain.   Musculoskeletal:  Positive for arthralgias, back pain, myalgias, neck pain and neck stiffness.   Neurological:  Positive for numbness and headaches.   All other systems reviewed and are negative.    Objective   There were no vitals taken for this visit.  There is no height or weight on file to calculate BMI.    Physical Exam    Assessment & Plan     Independent Review of Radiographic Studies:      ***    Medical Decision Making:      ***    There are no diagnoses linked to this encounter.    No follow-ups on file.           This document " signed by CASSANDRA dAler MD February 5, 2024 09:32 EST

## 2024-02-13 ENCOUNTER — TELEPHONE (OUTPATIENT)
Dept: NEUROSURGERY | Facility: CLINIC | Age: 49
End: 2024-02-13
Payer: COMMERCIAL

## 2024-11-12 ENCOUNTER — OFFICE VISIT (OUTPATIENT)
Dept: CARDIOLOGY | Facility: CLINIC | Age: 49
End: 2024-11-12
Payer: COMMERCIAL

## 2024-11-12 VITALS
SYSTOLIC BLOOD PRESSURE: 145 MMHG | BODY MASS INDEX: 30.67 KG/M2 | OXYGEN SATURATION: 98 % | HEART RATE: 91 BPM | WEIGHT: 239 LBS | HEIGHT: 74 IN | DIASTOLIC BLOOD PRESSURE: 95 MMHG

## 2024-11-12 DIAGNOSIS — Z87.891 FORMER SMOKER: ICD-10-CM

## 2024-11-12 DIAGNOSIS — E78.2 MIXED HYPERLIPIDEMIA: Primary | ICD-10-CM

## 2024-11-12 DIAGNOSIS — R06.02 SOB (SHORTNESS OF BREATH): ICD-10-CM

## 2024-11-12 DIAGNOSIS — F19.11 HISTORY OF DRUG ABUSE: ICD-10-CM

## 2024-11-12 DIAGNOSIS — R07.2 PRECORDIAL PAIN: ICD-10-CM

## 2024-11-12 DIAGNOSIS — R00.2 PALPITATIONS: ICD-10-CM

## 2024-11-12 PROBLEM — Z13.21 ENCOUNTER FOR VITAMIN DEFICIENCY SCREENING: Status: RESOLVED | Noted: 2021-12-11 | Resolved: 2024-11-12

## 2024-11-12 PROBLEM — Z79.899 ENCOUNTER FOR MONITORING SUBOXONE MAINTENANCE THERAPY: Status: RESOLVED | Noted: 2021-12-11 | Resolved: 2024-11-12

## 2024-11-12 PROBLEM — F51.01 PRIMARY INSOMNIA: Status: RESOLVED | Noted: 2021-12-11 | Resolved: 2024-11-12

## 2024-11-12 PROBLEM — Z13.220 LIPID SCREENING: Status: RESOLVED | Noted: 2021-12-11 | Resolved: 2024-11-12

## 2024-11-12 PROBLEM — K21.9 GASTROESOPHAGEAL REFLUX DISEASE: Status: ACTIVE | Noted: 2024-11-12

## 2024-11-12 PROBLEM — R07.89 ATYPICAL CHEST PAIN: Status: RESOLVED | Noted: 2023-04-05 | Resolved: 2024-11-12

## 2024-11-12 PROBLEM — R07.89 ATYPICAL CHEST PAIN: Status: ACTIVE | Noted: 2023-04-05

## 2024-11-12 PROBLEM — Z83.3 FAMILY HISTORY OF DIABETES MELLITUS: Status: RESOLVED | Noted: 2021-12-11 | Resolved: 2024-11-12

## 2024-11-12 PROBLEM — Z51.81 ENCOUNTER FOR MONITORING SUBOXONE MAINTENANCE THERAPY: Status: RESOLVED | Noted: 2021-12-11 | Resolved: 2024-11-12

## 2024-11-12 PROCEDURE — 99204 OFFICE O/P NEW MOD 45 MIN: CPT | Performed by: INTERNAL MEDICINE

## 2024-11-12 RX ORDER — MULTIVITAMIN WITH IRON
TABLET ORAL DAILY
COMMUNITY

## 2024-11-12 RX ORDER — PREGABALIN 50 MG/1
50 CAPSULE ORAL 2 TIMES DAILY PRN
COMMUNITY
Start: 2024-11-08

## 2024-11-12 RX ORDER — TYROSINE 500 MG
TABLET ORAL DAILY
COMMUNITY

## 2024-11-12 RX ORDER — OMEGA-3S/DHA/EPA/FISH OIL/D3 300MG-1000
CAPSULE ORAL DAILY
COMMUNITY

## 2024-11-12 RX ORDER — NICOTINE POLACRILEX 4 MG/1
GUM, CHEWING ORAL DAILY
COMMUNITY

## 2024-11-12 NOTE — PROGRESS NOTES
Subjective   Milton Madrid is a 49 y.o. male     Chief Complaint   Patient presents with    Establish Care     Here for eval. Per pcp    Palpitations    Chest Pain    Shortness of Breath       PROBLEM LIST:     Chest Pain  SOB  Palpitations  Hyperlipidemia  GERD  Prior drug use  Former smoker / smokeless tobacco    Specialty Problems    None        HPI:  Mr. Milton Madrid is a 49-year-old patient of Dr. Delarosa dissociatively seen today for evaluation of chest discomfort.    Mr. Madrid describes several years of chest discomfort.  He has a fleeting forceful impulse in his mid chest.  He first noticed this when he was coming off of medication directed at his drug dependence.  Since that time he has noted similar symptoms with emotional stress or in certain physical positions.  However, over the last month he has made a deliberate effort at changing his diet and exercise patterns.  He describes that he jogs daily and does 30,000 steps a day.  Since initiating that activity has had no further symptoms.  Mr. Madrid has no exertional chest discomfort.  He describes no tachypalpitations.    The patient also denies orthopnea, PND, or lower extremity edema.  He has no dizziness, presyncope, or syncope.  He describes right calf pain with activity.  He is also noted a change in the color of his uncles.    Mr. Madrid describes that he has been clean for 2 years.  He had extensive and methamphetamine use as well as daily alcohol for number of years.                          PRIOR MEDICATIONS    Current Outpatient Medications on File Prior to Visit   Medication Sig Dispense Refill    Aspirin Low Dose 81 MG EC tablet Take 1 tablet by mouth Daily.      buprenorphine-naloxone (SUBOXONE) 8-2 MG per SL tablet Place 1-1.5 tablets under the tongue Daily.      L-Tyrosine 500 MG tablet Take  by mouth Daily.      Magnesium 250 MG tablet Take  by mouth Daily.      Omega-3 Fatty Acids (Ultra Omega-3 Fish Oil) 1400 MG capsule Take  by mouth Daily.    "   Omeprazole 20 MG tablet delayed-release Daily.      pregabalin (LYRICA) 50 MG capsule Take 1 capsule by mouth 2 (Two) Times a Day As Needed.      Unable to find 1 each 1 (One) Time. Alpha  mg tab bid      [DISCONTINUED] cyclobenzaprine (FLEXERIL) 10 MG tablet  (Patient not taking: Reported on 2024)      [DISCONTINUED] DULoxetine (CYMBALTA) 60 MG capsule Take 1 capsule by mouth Daily. (Patient not taking: Reported on 2024) 30 capsule 3    [DISCONTINUED] hydrOXYzine pamoate (VISTARIL) 25 MG capsule Take 1 capsule by mouth 2 (Two) Times a Day As Needed for Itching. (Patient not taking: Reported on 2024) 60 capsule 2     No current facility-administered medications on file prior to visit.       ALLERGIES:    Patient has no known allergies.    PAST MEDICAL HISTORY:    Past Medical History:   Diagnosis Date    Alcoholism     GERD (gastroesophageal reflux disease)     Hyperlipidemia     Low back pain     Panic attack     Substance abuse        SURGICAL HISTORY:    Past Surgical History:   Procedure Laterality Date    OTHER SURGICAL HISTORY      on bottom I&D       SOCIAL HISTORY:    Social History     Socioeconomic History    Marital status:    Tobacco Use    Smoking status: Former     Current packs/day: 0.00     Average packs/day: 1.5 packs/day for 31.0 years (46.5 ttl pk-yrs)     Types: Cigarettes     Start date: 1991     Quit date: 2022     Years since quittin.8    Smokeless tobacco: Former    Tobacco comments:     Used to smoke 1 PPD for approx. 30-35 yrs.    Substance and Sexual Activity    Alcohol use: Not Currently     Comment: in past    Drug use: Not Currently     Types: \"Crack\" cocaine, Cocaine(coke), Heroin, Hydrocodone, Marijuana, Methamphetamines, Opium, Oxycodone     Comment: Is sober and in recovery.    Sexual activity: Yes     Partners: Female       FAMILY HISTORY:    Family History   Problem Relation Age of Onset    Hyperlipidemia Mother     Heart attack Father     " "Diabetes Father     Alcohol abuse Maternal Uncle        Review of Systems   Constitutional:  Negative for chills, diaphoresis, fever and unexpected weight change.   HENT: Negative.     Eyes:  Positive for visual disturbance (glasses prn).   Respiratory:  Positive for shortness of breath.    Cardiovascular:  Positive for chest pain, palpitations and leg swelling (RLE).   Gastrointestinal:  Negative for blood in stool, constipation and diarrhea.   Endocrine: Negative for cold intolerance and heat intolerance.   Genitourinary: Negative.    Musculoskeletal:  Positive for arthralgias and myalgias.   Skin: Negative.    Allergic/Immunologic: Negative.    Neurological: Negative.    Hematological:  Bruises/bleeds easily.   Psychiatric/Behavioral:  Positive for sleep disturbance.        VISIT VITALS:  Vitals:    11/12/24 1404   BP: 145/95   BP Location: Left arm   Patient Position: Sitting   Pulse: 91   SpO2: 98%   Weight: 108 kg (239 lb)   Height: 188 cm (74\")      /95 (BP Location: Left arm, Patient Position: Sitting)   Pulse 91   Ht 188 cm (74\")   Wt 108 kg (239 lb)   SpO2 98%   BMI 30.69 kg/m²     RECENT LABS:    Objective       Physical Exam  Vitals and nursing note reviewed.   Constitutional:       General: He is not in acute distress.     Appearance: He is well-developed.   HENT:      Head: Normocephalic and atraumatic.   Eyes:      Conjunctiva/sclera: Conjunctivae normal.      Pupils: Pupils are equal, round, and reactive to light.   Neck:      Vascular: No carotid bruit, hepatojugular reflux or JVD.      Trachea: No tracheal deviation.      Comments: Nl. Carotid upstrokes  Cardiovascular:      Rate and Rhythm: Normal rate and regular rhythm.      Pulses:           Radial pulses are 2+ on the right side and 2+ on the left side.      Heart sounds: Normal heart sounds, S1 normal and S2 normal. No murmur heard.     No friction rub. S4 (very soft) sounds present.   Pulmonary:      Effort: Pulmonary effort is " normal.      Breath sounds: Normal breath sounds. No wheezing, rhonchi or rales.      Comments: Nl. Expir. Phase  Nl. Breath sound intensity    Abdominal:      General: Bowel sounds are normal. There is no distension or abdominal bruit.      Palpations: Abdomen is soft. There is no mass.      Tenderness: There is no abdominal tenderness. There is no guarding or rebound.      Comments: No organomegaly   Musculoskeletal:         General: No tenderness or deformity. Normal range of motion.      Cervical back: Normal range of motion and neck supple.      Right lower leg: No edema.      Left lower leg: Edema present.      Comments: LLE, trace edema at sock line, excellent pedal pulse, very mild demian. Stasis changes, cap. Refill < 2 sec.   RLE, no edema, excellent pedal pulses, cap. Refill < 1 sec.    Skin:     General: Skin is warm and dry.      Coloration: Skin is not pale.      Findings: No erythema or rash.   Neurological:      Mental Status: He is alert and oriented to person, place, and time.   Psychiatric:         Behavior: Behavior normal.         Thought Content: Thought content normal.         Judgment: Judgment normal.         Procedures      Assessment & Plan   #1.  Chest discomfort.  I believe the patient is describing sensed extrasystoles.  Symptoms would tend to be worse with anxiety or emotional stress and have improved with exercise.  We will have the patient wear a 14-day event monitor to confirm or refute that impression.  Additionally, I would like to review the stress test and echo done at [facility] last year to see if further evaluation is indicated in that regard.    2.  Leg pain.  Symptoms were somewhat suggestive of claudication in the right leg.  However, pulses are intact and symmetric and capillary refill is 1 second.  Therefore, given the patient's back and buttocks and thigh symptoms I suspect symptoms are related to radiculopathy.  I see no reason for further  evaluation from a cardiovascular standpoint.    3.  Exertional dyspnea.  The patient is now very physically active without symptoms.  I would reserve repeat evaluation of systolic performance and ischemia for more typical symptoms presuming echo and stress test done last year were unremarkable.  I would like to review those studies.    4.  Lipidemia by history.    5.  Mr. Madrid was asked to follow with Dr. Amaya as instructed and we will plan on seeing him in follow-up after testing or on appearing basis as discussed.   Diagnosis Plan   1. Mixed hyperlipidemia        2. Palpitations        3. Precordial pain        4. SOB (shortness of breath)        5. Former smoker        6. History of drug abuse            No follow-ups on file.         Milton Madrid  reports that he quit smoking about 2 years ago. His smoking use included cigarettes. He started smoking about 33 years ago. He has a 46.5 pack-year smoking history. He has quit using smokeless tobacco. I have educated him on the risk of diseases from using tobacco products such as cancer, COPD, and heart disease.                           Electronically signed by:    Scribed for Corwin Murrell MD by Sharon Jean LPN on November 12, 2024  at 14:37 EST    I, Corwin Murrell MD personally performed the services described in this documentation as scribed by the above named individual in my presence, and it is both accurate and complete. November 12, 2024 14:37 EST      Dictated Utilizing Dragon Dictation: Part of this note may be an electronic transcription/translation of spoken language to printed text using the Dragon Dictation System.

## 2024-11-18 ENCOUNTER — TELEPHONE (OUTPATIENT)
Dept: CARDIOLOGY | Facility: CLINIC | Age: 49
End: 2024-11-18
Payer: COMMERCIAL

## 2024-11-18 NOTE — TELEPHONE ENCOUNTER
Caller: Milton Madrid    Relationship: Self    Best call back number: [phone]    What is the best time to reach you: ANY      What was the call regarding: PATIENT NEEDS PADS FOR HIS MONITOR. DID ADVISE HE COULD CALL THE NUMBER ON THE BOX. PLEASE CALL THE PATIENT TO ADVISE IF THESE CAN BE PICKED UP AT THE OFFICE.     Is it okay if the provider responds through MyChart: NO

## 2025-01-13 ENCOUNTER — TELEPHONE (OUTPATIENT)
Dept: CARDIOLOGY | Facility: CLINIC | Age: 50
End: 2025-01-13
Payer: COMMERCIAL

## 2025-01-13 NOTE — TELEPHONE ENCOUNTER
RELAY  MONITOR  Called patient to notify of no acute findings or abnormalities. Keep follow up as scheduled. If you have any problem between now and then give our office a call.   ----- Message from Corwin Murrell sent at 1/13/2025 11:25 AM EST -----  Keep follow-up appointment as scheduled  ----- Message -----  From: Corwin Murrell MD  Sent: 1/9/2025   5:28 PM EST  To: Corwin Murrell MD

## 2025-02-18 ENCOUNTER — OFFICE VISIT (OUTPATIENT)
Dept: CARDIOLOGY | Facility: CLINIC | Age: 50
End: 2025-02-18
Payer: COMMERCIAL

## 2025-02-18 VITALS
BODY MASS INDEX: 26.36 KG/M2 | HEIGHT: 74 IN | SYSTOLIC BLOOD PRESSURE: 143 MMHG | OXYGEN SATURATION: 94 % | DIASTOLIC BLOOD PRESSURE: 102 MMHG | HEART RATE: 70 BPM | WEIGHT: 205.4 LBS

## 2025-02-18 DIAGNOSIS — R07.2 PRECORDIAL PAIN: Primary | ICD-10-CM

## 2025-02-18 DIAGNOSIS — K21.9 GASTROESOPHAGEAL REFLUX DISEASE, UNSPECIFIED WHETHER ESOPHAGITIS PRESENT: ICD-10-CM

## 2025-02-18 DIAGNOSIS — R00.2 PALPITATIONS: ICD-10-CM

## 2025-02-18 PROCEDURE — 99213 OFFICE O/P EST LOW 20 MIN: CPT | Performed by: CLINICAL NURSE SPECIALIST

## 2025-02-18 RX ORDER — PANTOPRAZOLE SODIUM 40 MG/1
1 TABLET, DELAYED RELEASE ORAL EVERY 12 HOURS SCHEDULED
COMMUNITY
Start: 2025-02-04

## 2025-02-18 NOTE — PROGRESS NOTES
Subjective     Milton Madrid is a 50 y.o. male who presents today for Follow-up (3mth (Dr. Murrell)).    CHIEF COMPLIANT  Chief Complaint   Patient presents with    Follow-up     3mth (Dr. Murrell)       Active Problems:  Chest Pain  Dyspnea  Palpitations  3.1 Cardiac monitor:   Sinus rhythm is the baseline rhythm throughout the study with a maximal heart rate of 144 and a minimum of 37 bpm. Artifact noted during the study. No ectopy, arrhythmia, block noted. Complaints of palpitations occurred during sinus without ectopy.  Hyperlipidemia  GERD  Prior drug use  Former smoker / smokeless tobacco    HPI  The patient is a 50-year-old male that returns for follow-up to discuss recent cardiac monitor.  He wore a cardiac monitor which showed a baseline rhythm of normal sinus rhythm throughout the study with a max heart rate of 144 bpm and a minimum heart rate of 37 bpm.  No ectopy, arrhythmia or block noted.  Complaints of palpitations occurred during sinus without ectopy.  The patient states that he is undergoing GI workup.  He has had significant issues with GERD and reflux.  Due to some of these issues he has had weight loss.  He states since losing weight his symptoms have improved.  He is going to be scheduled for an EGD and colonoscopy in the near future.  The patient states that the symptoms he was having is related to his GI issues.  He denies anginal type chest pain.  He does occasionally get some discomfort after eating.  He denies worsening dyspnea, syncope, near syncope.  He states palpitation symptoms have pretty much subsided.  Blood pressure is borderline in the office but the patient states it typically does not run elevated at home.    PRIOR MEDS  Current Outpatient Medications on File Prior to Visit   Medication Sig Dispense Refill    Aspirin Low Dose 81 MG EC tablet Take 1 tablet by mouth Daily.      buprenorphine-naloxone (SUBOXONE) 8-2 MG per SL tablet Place 1-1.5 tablets under the tongue Daily.       "L-Tyrosine 500 MG tablet Take  by mouth Daily.      Magnesium 250 MG tablet Take  by mouth Daily.      Omega-3 Fatty Acids (Ultra Omega-3 Fish Oil) 1400 MG capsule Take  by mouth Daily.      Omeprazole 20 MG tablet delayed-release Daily.      pantoprazole (PROTONIX) 40 MG EC tablet Take 1 tablet by mouth Every 12 (Twelve) Hours.      pregabalin (LYRICA) 50 MG capsule Take 1 capsule by mouth 2 (Two) Times a Day As Needed.      Unable to find 1 each 1 (One) Time. Alpha  mg tab bid       No current facility-administered medications on file prior to visit.       ALLERGIES  Patient has no known allergies.    HISTORY  Past Medical History:   Diagnosis Date    Alcoholism     GERD (gastroesophageal reflux disease)     Hyperlipidemia     Low back pain     Panic attack     Substance abuse        Social History     Socioeconomic History    Marital status:    Tobacco Use    Smoking status: Former     Current packs/day: 0.00     Average packs/day: 1.5 packs/day for 31.0 years (46.5 ttl pk-yrs)     Types: Cigarettes     Start date: 1/1/1991     Quit date: 1/1/2022     Years since quitting: 3.1    Smokeless tobacco: Former    Tobacco comments:     Used to smoke 1 PPD for approx. 30-35 yrs.    Substance and Sexual Activity    Alcohol use: Not Currently     Comment: in past    Drug use: Not Currently     Types: \"Crack\" cocaine, Cocaine(coke), Heroin, Hydrocodone, Marijuana, Methamphetamines, Opium, Oxycodone     Comment: Is sober and in recovery.    Sexual activity: Yes     Partners: Female       Family History   Problem Relation Age of Onset    Hyperlipidemia Mother     Heart attack Father     Diabetes Father     Alcohol abuse Maternal Uncle        Review of Systems   Constitutional:  Negative for fatigue.   HENT:  Positive for trouble swallowing (GERD).    Respiratory:  Negative for chest tightness and shortness of breath.    Cardiovascular:  Negative for chest pain, palpitations (Improved greatly w/ diet) and leg " "swelling.   Musculoskeletal:  Positive for back pain.   Neurological:  Positive for numbness (Neuropathy).   Psychiatric/Behavioral:  Positive for sleep disturbance (Off and on sleep issues due to back pain).        Objective     VITALS: BP (!) 143/102   Pulse 70   Ht 188 cm (74\")   Wt 93.2 kg (205 lb 6.4 oz)   SpO2 94%   BMI 26.37 kg/m²     LABS:   Lab Results (most recent)       None            IMAGING:   No Images in the past 120 days found..    EXAM:  Physical Exam  Constitutional:       Appearance: Normal appearance.   Eyes:      Pupils: Pupils are equal, round, and reactive to light.   Cardiovascular:      Rate and Rhythm: Normal rate and regular rhythm.      Pulses:           Carotid pulses are 2+ on the right side and 2+ on the left side.       Radial pulses are 2+ on the right side and 2+ on the left side.        Dorsalis pedis pulses are 2+ on the right side and 2+ on the left side.        Posterior tibial pulses are 2+ on the right side and 2+ on the left side.      Heart sounds: Normal heart sounds.   Pulmonary:      Effort: Pulmonary effort is normal.      Breath sounds: Normal breath sounds.   Abdominal:      General: Bowel sounds are normal.      Palpations: Abdomen is soft.   Musculoskeletal:      Right lower leg: No edema.      Left lower leg: No edema.   Skin:     General: Skin is warm and dry.      Capillary Refill: Capillary refill takes less than 2 seconds.   Neurological:      General: No focal deficit present.      Mental Status: He is alert and oriented to person, place, and time.   Psychiatric:         Mood and Affect: Mood normal.         Thought Content: Thought content normal.         Procedure   Procedures       Assessment & Plan    Diagnosis Plan   1. Precordial pain        2. Palpitations        3. Gastroesophageal reflux disease, unspecified whether esophagitis present          Plan:  1.  Precordial pain: The patient denies anginal type chest pain.  He does feel current symptoms " are related to his GI issues.  He is following with gastroenterology and will be scheduled for upcoming EGD and colonoscopy in the near future.  The patient was advised to notify our office if chest pain symptoms return or worsen.  2.  Palpitations: No significant findings on recent cardiac monitor.  Palpitation symptoms have pretty much subsided.  The patient was advised to notify our office if symptoms return.  3.  GERD: The patient is following closely with gastroenterology and will be undergoing further workup.  Return in about 1 year (around 2/18/2026).    Milton Madrid  reports that he quit smoking about 3 years ago. His smoking use included cigarettes. He started smoking about 34 years ago. He has a 46.5 pack-year smoking history. He has quit using smokeless tobacco.     BMI is >= 30 and <35. (Class 1 Obesity). The following options were offered after discussion;: referral to primary care           MEDS ORDERED DURING VISIT:  No orders of the defined types were placed in this encounter.      DISCONTINUED MEDS DURING VISIT:   There are no discontinued medications.       This document has been electronically signed by PB Cotton  February 18, 2025 17:54 EST    Dictated Utilizing Dragon Dictation: Part of this note may be an electronic transcription/translation of spoken language to printed text using the Dragon Dictation System